# Patient Record
Sex: FEMALE | Race: WHITE
[De-identification: names, ages, dates, MRNs, and addresses within clinical notes are randomized per-mention and may not be internally consistent; named-entity substitution may affect disease eponyms.]

---

## 2021-12-18 ENCOUNTER — HOSPITAL ENCOUNTER (EMERGENCY)
Dept: HOSPITAL 43 - DL.ED | Age: 26
Discharge: HOME | End: 2021-12-18
Payer: MEDICAID

## 2021-12-18 DIAGNOSIS — S00.33XA: Primary | ICD-10-CM

## 2021-12-18 DIAGNOSIS — Z88.5: ICD-10-CM
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
## 2021-12-18 PROCEDURE — 99284 EMERGENCY DEPT VISIT MOD MDM: CPT

## 2021-12-18 PROCEDURE — 70486 CT MAXILLOFACIAL W/O DYE: CPT

## 2021-12-18 PROCEDURE — 96374 THER/PROPH/DIAG INJ IV PUSH: CPT
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
